# Patient Record
Sex: FEMALE | Race: WHITE | Employment: FULL TIME | URBAN - METROPOLITAN AREA
[De-identification: names, ages, dates, MRNs, and addresses within clinical notes are randomized per-mention and may not be internally consistent; named-entity substitution may affect disease eponyms.]

---

## 2024-07-11 ENCOUNTER — OFFICE VISIT (OUTPATIENT)
Dept: ORTHOPEDIC SURGERY | Age: 29
End: 2024-07-11
Payer: COMMERCIAL

## 2024-07-11 VITALS — WEIGHT: 130 LBS | HEIGHT: 64 IN | BODY MASS INDEX: 22.2 KG/M2

## 2024-07-11 DIAGNOSIS — M22.2X2 PATELLOFEMORAL PAIN SYNDROME OF LEFT KNEE: Primary | ICD-10-CM

## 2024-07-11 DIAGNOSIS — M25.562 LEFT KNEE PAIN, UNSPECIFIED CHRONICITY: ICD-10-CM

## 2024-07-11 PROCEDURE — 99204 OFFICE O/P NEW MOD 45 MIN: CPT | Performed by: ORTHOPAEDIC SURGERY

## 2024-07-11 NOTE — PROGRESS NOTES
answered.    Patient in today for evaluation and management of her left knee.  Based on examination today, no red flag symptoms for any significant structural pathology including meniscal or ligamentous rupture.  Does have some mild pain along the medial facet of patella which could be secondary to some patellofemoral syndrome and patellofemoral chondromalacia.  At this point, we did offer multiple options for treatment today including oral anti-inflammatories, physical therapy, injections, or an MRI for diagnostic evaluation of her knee.  This point, she would like to begin with a physical therapy regimen which she will perform at our [place]  PT office. We also get her set up for a referral for custom orthotics that she has had some difficulty with her shoe wear especially during work as a .  We like see her back in approximately 4 weeks time.  If at that time, she continues to notice problems with her knee, we will then obtain MRI scan for further diagnostic evaluation.    All the patient's questions were answered while in the clinic.  The patient is understanding of all instructions and agrees with the plan.    I spent 45 minutes on patient education and coordinating care today, inclusive of ordering of testing and treatment(s) after the visit. This also included time examining the patient, reviewing the patient's chart and relevant imaging, and chart documentation. Further this was inclusive of patient counseling regarding  treatment options,  alternatives to treatment, and the complications and risks related to those treatment options along with expectations of outcome related to those treatment. This excluded any separately billed procedures.      Follow up in: Return in about 6 weeks (around 8/22/2024).          Sincerely,  Samuel Enamorado MD  Orthopedic Surgery Sports Medicine Fellow    07/11/24  12:17 PM    Sincerely,    Kian Vazquez MD [facility]  Orthopaedic Surgeon - Hip Preservation & Sports

## 2024-07-24 ENCOUNTER — HOSPITAL ENCOUNTER (OUTPATIENT)
Dept: PHYSICAL THERAPY | Age: 29
Setting detail: THERAPIES SERIES
Discharge: HOME | End: 2024-07-24
Attending: ORTHOPAEDIC SURGERY
Payer: COMMERCIAL

## 2024-07-24 DIAGNOSIS — M25.562 LEFT MEDIAL KNEE PAIN: Primary | ICD-10-CM

## 2024-07-24 PROCEDURE — 97161 PT EVAL LOW COMPLEX 20 MIN: CPT

## 2024-07-24 PROCEDURE — 97110 THERAPEUTIC EXERCISES: CPT

## 2024-07-26 ENCOUNTER — TELEPHONE (OUTPATIENT)
Dept: ORTHOPEDIC SURGERY | Age: 29
End: 2024-07-26

## 2024-07-26 NOTE — TELEPHONE ENCOUNTER
General Question     Subject: ANTI-INFLAMMATORY   Patient and /or Facility Request: Shirley Guidry   Contact Number: [phone]     PATIENT CALLING REQUESTING AN ANTI-INFLAMMATORY MEDICATION FOR HER LEFT KNEE     PLEASE ADVISE

## 2024-07-29 RX ORDER — NAPROXEN 500 MG/1
500 TABLET ORAL 2 TIMES DAILY WITH MEALS
Qty: 60 TABLET | Refills: 1 | Status: SHIPPED | OUTPATIENT
Start: 2024-07-29

## 2024-07-31 ENCOUNTER — HOSPITAL ENCOUNTER (OUTPATIENT)
Dept: PHYSICAL THERAPY | Age: 29
Setting detail: THERAPIES SERIES
Discharge: HOME | End: 2024-07-31
Attending: ORTHOPAEDIC SURGERY
Payer: COMMERCIAL

## 2024-07-31 PROCEDURE — 97110 THERAPEUTIC EXERCISES: CPT

## 2024-07-31 NOTE — FLOWSHEET NOTE
[facility]- Outpatient Rehabilitation and Therapy [address], [place] [address] office: [phone] fax: [phone]         Physical Therapy: TREATMENT/PROGRESS NOTE   Patient: Shirley Guidry (29 y.o. female)   Examination Date: 2024   :  1995 MRN: [id number]   Visit #: 2   Insurance Allowable Auth Needed   60 [x]Yes    []No    Insurance: Payor: AMBETTER / Plan: AMBETTER / Product Type: *No Product type* /   Insurance ID: [id number] - (Commercial)  Secondary Insurance (if applicable):    Treatment Diagnosis:     ICD-10-CM    1. Left medial knee pain  M25.562          Medical Diagnosis:  No admission diagnoses are documented for this encounter.   Referring Physician: Kian Vazquez MD  PCP: None, None     Plan of care signed (Y/N): Y    Date of Patient follow up with Physician:      Progress Report/POC: NO  POC update due: (10 visits /OR AUTH LIMITS, whichever is less)  2024                                             Medical History:  Comorbidities:  None  Relevant Medical History: hx of chondroplasty, plica excision and lateral release in                                          Precautions/ Contra-indications:           Latex allergy:  NO  Pacemaker:    NO  Contraindications for Manipulation: None  Date of Surgery: NA  Other:    Red Flags:  None    Suicide Screening:   The patient did not verbalize a primary behavioral concern, suicidal ideation, suicidal intent, or demonstrate suicidal behaviors.    Preferred Language for Healthcare:   [x] English       [] other:    SUBJECTIVE EXAMINATION     Patient stated complaint: Patient went hiking May 6th in [place]  on steep inclines for 3 miles. A day or so later she began experiencing significant left medial knee pain. Pain has not significantly improved over the past 2.5 months. Patient has cut back her physical activity to the point of not walking her dog now secondary to knee pain. Her job is more physically

## 2024-08-07 ENCOUNTER — APPOINTMENT (OUTPATIENT)
Dept: PHYSICAL THERAPY | Age: 29
End: 2024-08-07
Attending: ORTHOPAEDIC SURGERY
Payer: COMMERCIAL

## 2024-08-15 ENCOUNTER — HOSPITAL ENCOUNTER (OUTPATIENT)
Dept: PHYSICAL THERAPY | Age: 29
Setting detail: THERAPIES SERIES
Discharge: HOME | End: 2024-08-15
Attending: ORTHOPAEDIC SURGERY
Payer: COMMERCIAL

## 2024-08-15 PROCEDURE — 97110 THERAPEUTIC EXERCISES: CPT

## 2024-08-15 PROCEDURE — 97530 THERAPEUTIC ACTIVITIES: CPT

## 2024-08-15 NOTE — FLOWSHEET NOTE
[facility]- Outpatient Rehabilitation and Therapy [address], [place] [address] office: [phone] fax: [phone]         Physical Therapy: TREATMENT/PROGRESS NOTE   Patient: Shirley Guidry (29 y.o. female)   Examination Date: 08/15/2024   :  1995 MRN: [id number]   Visit #: 3   Insurance Allowable Auth Needed   60  8 visits - [x]Yes    []No    Insurance: Payor: AMBETTER / Plan: AMBETTER / Product Type: *No Product type* /   Insurance ID: [id number] - (Commercial)  Secondary Insurance (if applicable):    Treatment Diagnosis:     ICD-10-CM    1. Left medial knee pain  M25.562          Medical Diagnosis:  Patellofemoral disorders, left knee [M22.2X2]   Referring Physician: Kian Vazquez MD  PCP: None, None     Plan of care signed (Y/N): Y    Date of Patient follow up with Physician:      Progress Report/POC: NO  POC update due: (10 visits /OR AUTH LIMITS, whichever is less)  2024                                             Medical History:  Comorbidities:  None  Relevant Medical History: hx of chondroplasty, plica excision and lateral release in                                          Precautions/ Contra-indications:           Latex allergy:  NO  Pacemaker:    NO  Contraindications for Manipulation: None  Date of Surgery: NA  Other:    Red Flags:  None    Suicide Screening:   The patient did not verbalize a primary behavioral concern, suicidal ideation, suicidal intent, or demonstrate suicidal behaviors.    Preferred Language for Healthcare:   [x] English       [] other:    SUBJECTIVE EXAMINATION     Patient stated complaint: Patient went hiking May 6th in [place]  on steep inclines for 3 miles. A day or so later she began experiencing significant left medial knee pain. Pain has not significantly improved over the past 2.5 months. Patient has cut back her physical activity to the point of not walking her dog now secondary to knee pain. Her job is more

## 2024-09-03 ENCOUNTER — HOSPITAL ENCOUNTER (OUTPATIENT)
Dept: PHYSICAL THERAPY | Age: 29
Setting detail: THERAPIES SERIES
Discharge: HOME | End: 2024-09-03
Attending: ORTHOPAEDIC SURGERY
Payer: COMMERCIAL

## 2024-09-03 PROCEDURE — 97110 THERAPEUTIC EXERCISES: CPT

## 2024-09-03 NOTE — PLAN OF CARE
Adjusted  3. Patient will demonstrate increased Strength of LLE to at least 5/5 throughout without pain to allow for proper functional mobility to enable patient to return to running and hiking.   [x] Progressing: [] Met: [] Not Met: [] Adjusted  4. Patient will return to biking without increased symptoms or restriction.   [x] Progressing: [] Met: [] Not Met: [] Adjusted      Overall Progression Towards Functional goals/ Treatment Progress Update:  [x] Patient is progressing as expected towards functional goals listed.    [] Progression is slowed due to complexities/Impairments listed.  [] Progression has been slowed due to co-morbidities.  [] Plan just implemented, too soon (<30days) to assess goals progression   [] Goals require adjustment due to lack of progress  [] Patient is not progressing as expected and requires additional follow up with physician  [] Other:     TREATMENT PLAN     Frequency/Duration: 1x/week for 4-6 weeks for the following treatment interventions:    Interventions:  Therapeutic Exercise (26847) including: strength training, ROM, and functional mobility  Therapeutic Activities (21226) including: functional mobility training and education.  Neuromuscular Re-education (24734) activation and proprioception, including postural re-education.    Manual Therapy (47554) as indicated to include: Passive Range of Motion, Soft Tissue Mobilization, Dry Needling/IASTM, Trigger Point Release, and Myofascial Release  Modalities as needed that may include: Cryotherapy and Thermal Agents  Patient education on joint protection, activity modification, and progression of HEP    Plan: POC initiated as per evaluation    Electronically Signed by Yasmine Gonzalez PT  Date: 09/03/2024     Note: Portions of this note have been templated and/or copied from initial evaluation, reassessments and prior notes for documentation efficiency.    Note: If patient does not return for scheduled/recommended follow up visits, this

## 2024-09-23 ENCOUNTER — HOSPITAL ENCOUNTER (OUTPATIENT)
Dept: PHYSICAL THERAPY | Age: 29
Setting detail: THERAPIES SERIES
Discharge: HOME | End: 2024-09-23
Attending: ORTHOPAEDIC SURGERY
Payer: COMMERCIAL

## 2024-09-24 ENCOUNTER — HOSPITAL ENCOUNTER (OUTPATIENT)
Dept: PHYSICAL THERAPY | Age: 29
Setting detail: THERAPIES SERIES
Discharge: HOME | End: 2024-09-24
Attending: ORTHOPAEDIC SURGERY
Payer: COMMERCIAL

## 2024-09-24 PROCEDURE — 97110 THERAPEUTIC EXERCISES: CPT

## 2024-10-30 ENCOUNTER — HOSPITAL ENCOUNTER (OUTPATIENT)
Dept: PHYSICAL THERAPY | Age: 29
Setting detail: THERAPIES SERIES
Discharge: HOME | End: 2024-10-30
Attending: ORTHOPAEDIC SURGERY
Payer: COMMERCIAL

## 2024-10-30 PROCEDURE — 97110 THERAPEUTIC EXERCISES: CPT

## 2024-10-30 NOTE — FLOWSHEET NOTE
[facility]- Outpatient Rehabilitation and Therapy [address], [place] [address] office: [phone] fax: [phone]        Physical Therapy: TREATMENT/PROGRESS NOTE   Patient: Shirley Guidry (29 y.o. female)   Examination Date: 10/30/2024   :  1995 MRN: [id number]   Visit #: 7   Insurance Allowable Auth Needed   60  8 visits - [x]Yes    []No    Insurance: Payor: AMBETTER / Plan: AMBETTER / Product Type: *No Product type* /   Insurance ID: [id number] - (Commercial)  Secondary Insurance (if applicable):    Treatment Diagnosis:     ICD-10-CM    1. Left medial knee pain  M25.562          Medical Diagnosis:  Patellofemoral pain syndrome of left knee [M22.2X2]   Referring Physician: Kian Vazquez MD  PCP: None, None     Plan of care signed (Y/N): Y    Date of Patient follow up with Physician:      Progress Report/POC: NO  POC update due: (10 visits /OR AUTH LIMITS, whichever is less)  10/24/2024                                             Medical History:  Comorbidities:  None  Relevant Medical History: hx of chondroplasty, plica excision and lateral release in                                          Precautions/ Contra-indications:           Latex allergy:  NO  Pacemaker:    NO  Contraindications for Manipulation: None  Date of Surgery: NA  Other:    Red Flags:  None    Suicide Screening:   The patient did not verbalize a primary behavioral concern, suicidal ideation, suicidal intent, or demonstrate suicidal behaviors.    Preferred Language for Healthcare:   [x] English       [] other:    SUBJECTIVE EXAMINATION     Patient stated complaint: Patient went hiking May 6th in [place]  on steep inclines for 3 miles. A day or so later she began experiencing significant left medial knee pain. Pain has not significantly improved over the past 2.5 months. Patient has cut back her physical activity to the point of not walking her dog now secondary to knee pain. Her job is